# Patient Record
Sex: FEMALE | Race: WHITE
[De-identification: names, ages, dates, MRNs, and addresses within clinical notes are randomized per-mention and may not be internally consistent; named-entity substitution may affect disease eponyms.]

---

## 2018-12-30 ENCOUNTER — HOSPITAL ENCOUNTER (EMERGENCY)
Dept: HOSPITAL 80 - FED | Age: 28
Discharge: HOME | End: 2018-12-30
Payer: COMMERCIAL

## 2018-12-30 VITALS — DIASTOLIC BLOOD PRESSURE: 70 MMHG | SYSTOLIC BLOOD PRESSURE: 112 MMHG

## 2018-12-30 DIAGNOSIS — E86.9: ICD-10-CM

## 2018-12-30 DIAGNOSIS — K52.9: Primary | ICD-10-CM

## 2018-12-30 LAB — PLATELET # BLD: 347 10^3/UL (ref 150–400)

## 2018-12-30 NOTE — EDPHY
H & P


Time Seen by Provider: 12/30/18 20:18


HPI/ROS: 





CHIEF COMPLAINT:  Nausea vomiting diarrhea





HISTORY OF PRESENT ILLNESS:  Patient is a 28-year-old female with no 

significant past medical history who started with nausea vomiting diarrhea 

yesterday evening.  She has been unable to tolerate any orals today.  She has 

not urinated today.  She has no history of abdominal surgeries.  She has seen 

no blood in emesis or diarrhea.  She has no history of Crohn's or ulcerative 

colitis.  Denies any pain with urination.  She has had no fever.





REVIEW OF SYSTEMS:


Constitutional:  No fever, no chills.


Eyes:  No discharge.


ENT:  No sore throat.


Cardiovascular:  No chest pain, no palpitations.


Respiratory:  No cough, no shortness of breath.


Gastrointestinal:  + abdominal pain, + vomiting.


Genitourinary:  No hematuria.


Musculoskeletal:  No back pain.


Skin:  No rashes.


Neurological:  No headache.


Smoking Status: Never smoked


Physical Exam: 





General Appearance:  Alert and no distress.


ENT:  normal dentition.  No tonsillar exudate or swelling.


Eyes:  Pupils equal and round no injection.


Respiratory:  Chest is nontender, lungs are clear to auscultation.


Cardiac:  regular rate and rhythm.  No lower extremity edema


Gastrointestinal:  Abdomen is soft and nontender, no masses, bowel sounds 

normal.


Musculoskeletal:  Neck is supple and nontender.


Extremities have full range of motion and are nontender without deformity


Skin:  No rashes or lesions.


Neuro:  Cranial nerves grossly intact.   Ambulatory.





Constitutional: 


 Initial Vital Signs











Temperature (C)  36.8 C   12/30/18 19:59


 


Heart Rate  112 H  12/30/18 19:59


 


Respiratory Rate  20   12/30/18 19:59


 


Blood Pressure  135/89 H  12/30/18 19:59


 


O2 Sat (%)  95   12/30/18 19:59








 











O2 Delivery Mode               Room Air














Allergies/Adverse Reactions: 


 





Penicillins Allergy (Verified 12/30/18 19:59)


 








Home Medications: 














 Medication  Instructions  Recorded


 


Adderall 10 mg Tablet  12/30/18


 


Lexapro  12/30/18


 


Ondansetron Odt [Zofran Odt 4 mg 4 mg PO Q8 #12 tab 12/30/18





(*)]  














Medical Decision Making


ED Course/Re-evaluation: 





28-year-old female here with nausea vomiting and diarrhea consistent with viral 

gastroenteritis.  She was given IV hydration pins Zofran and Pepcid and feels 

significantly improved.  She is tolerating p.o. At time of discharge.  She is 

also given a small prescription for sublingual Zofran which she will use as 

needed.


Differential Diagnosis: 





Pregnancy, bowel obstruction, appendicitis, cholecystitis





- Data Points


Laboratory Results: 


 Laboratory Results





 12/30/18 20:26 





 12/30/18 20:26 





 











  12/30/18 12/30/18 12/30/18





  20:26 20:26 20:26


 


WBC      14.78 10^3/uL H 10^3/uL





     (3.80-9.50) 


 


RBC      5.35 10^6/uL H 10^6/uL





     (4.18-5.33) 


 


Hgb      17.2 g/dL H g/dL





     (12.6-16.3) 


 


Hct      48.4 % H %





     (38.0-47.0) 


 


MCV      90.5 fL fL





     (81.5-99.8) 


 


MCH      32.1 pg pg





     (27.9-34.1) 


 


MCHC      35.5 g/dL g/dL





     (32.4-36.7) 


 


RDW      12.4 % %





     (11.5-15.2) 


 


Plt Count      347 10^3/uL 10^3/uL





     (150-400) 


 


MPV      8.9 fL fL





     (8.7-11.7) 


 


Neut % (Auto)      92.0 % H %





     (39.3-74.2) 


 


Lymph % (Auto)      2.9 % L %





     (15.0-45.0) 


 


Mono % (Auto)      4.4 % L %





     (4.5-13.0) 


 


Eos % (Auto)      0.1 % L %





     (0.6-7.6) 


 


Baso % (Auto)      0.3 % %





     (0.3-1.7) 


 


Nucleat RBC Rel Count      0.0 % %





     (0.0-0.2) 


 


Absolute Neuts (auto)      13.60 10^3/uL H 10^3/uL





     (1.70-6.50) 


 


Absolute Lymphs (auto)      0.43 10^3/uL L 10^3/uL





     (1.00-3.00) 


 


Absolute Monos (auto)      0.65 10^3/uL 10^3/uL





     (0.30-0.80) 


 


Absolute Eos (auto)      0.01 10^3/uL L 10^3/uL





     (0.03-0.40) 


 


Absolute Basos (auto)      0.04 10^3/uL 10^3/uL





     (0.02-0.10) 


 


Absolute Nucleated RBC      0.00 10^3/uL 10^3/uL





     (0-0.01) 


 


Immature Gran %      0.3 % %





     (0.0-1.1) 


 


Immature Gran #      0.04 10^3/uL 10^3/uL





     (0.00-0.10) 


 


RBC/WBC/PLT Morphology      TNP 





    


 


Platelet Estimate      TNP 





    


 


Sodium    136 mEq/L mEq/L  





    (135-145)  


 


Potassium    3.7 mEq/L mEq/L  





    (3.5-5.2)  


 


Chloride    104 mEq/L mEq/L  





    ()  


 


Carbon Dioxide    21 mEq/l L mEq/l  





    (22-31)  


 


Anion Gap    11 mEq/L mEq/L  





    (6-14)  


 


BUN    11 mg/dL mg/dL  





    (7-23)  


 


Creatinine    0.7 mg/dL mg/dL  





    (0.6-1.0)  


 


Estimated GFR    > 60   





    


 


Glucose    117 mg/dL H mg/dL  





    ()  


 


Calcium    9.8 mg/dL mg/dL  





    (8.5-10.4)  


 


Total Bilirubin    1.9 mg/dL H mg/dL  





    (0.1-1.4)  


 


AST    23 IU/L IU/L  





    (14-46)  


 


ALT    26 IU/L IU/L  





    (9-52)  


 


Alkaline Phosphatase    75 IU/L IU/L  





    ()  


 


Total Protein    8.2 g/dL g/dL  





    (6.3-8.2)  


 


Albumin    5.0 g/dL g/dL  





    (3.5-5.0)  


 


Lipase    18 IU/L L IU/L  





    ()  


 


Beta HCG, Qual  NEGATIVE     





    


 


Urine Color      





    


 


Urine Appearance      





    


 


Urine pH      





    


 


Ur Specific Gravity      





    


 


Urine Protein      





    


 


Urine Ketones      





    


 


Urine Blood      





    


 


Urine Nitrate      





    


 


Urine Bilirubin      





    


 


Urine Urobilinogen      





    


 


Ur Leukocyte Esterase      





    


 


Urine RBC      





    


 


Urine WBC      





    


 


Ur Epithelial Cells      





    


 


Urine Mucus      





    


 


Urine Glucose      





    














  12/30/18





  20:21


 


WBC  





  


 


RBC  





  


 


Hgb  





  


 


Hct  





  


 


MCV  





  


 


MCH  





  


 


MCHC  





  


 


RDW  





  


 


Plt Count  





  


 


MPV  





  


 


Neut % (Auto)  





  


 


Lymph % (Auto)  





  


 


Mono % (Auto)  





  


 


Eos % (Auto)  





  


 


Baso % (Auto)  





  


 


Nucleat RBC Rel Count  





  


 


Absolute Neuts (auto)  





  


 


Absolute Lymphs (auto)  





  


 


Absolute Monos (auto)  





  


 


Absolute Eos (auto)  





  


 


Absolute Basos (auto)  





  


 


Absolute Nucleated RBC  





  


 


Immature Gran %  





  


 


Immature Gran #  





  


 


RBC/WBC/PLT Morphology  





  


 


Platelet Estimate  





  


 


Sodium  





  


 


Potassium  





  


 


Chloride  





  


 


Carbon Dioxide  





  


 


Anion Gap  





  


 


BUN  





  


 


Creatinine  





  


 


Estimated GFR  





  


 


Glucose  





  


 


Calcium  





  


 


Total Bilirubin  





  


 


AST  





  


 


ALT  





  


 


Alkaline Phosphatase  





  


 


Total Protein  





  


 


Albumin  





  


 


Lipase  





  


 


Beta HCG, Qual  





  


 


Urine Color  YELLOW 





  


 


Urine Appearance  CLEAR 





  


 


Urine pH  5.0 





   (5.0-7.5) 


 


Ur Specific Gravity  1.028 





   (1.002-1.030) 


 


Urine Protein  1+  H 





   (NEGATIVE) 


 


Urine Ketones  2+  H 





   (NEGATIVE) 


 


Urine Blood  NEGATIVE 





   (NEGATIVE) 


 


Urine Nitrate  NEGATIVE 





   (NEGATIVE) 


 


Urine Bilirubin  NEGATIVE 





   (NEGATIVE) 


 


Urine Urobilinogen  NEGATIVE EU EU





   (0.2-1.0) 


 


Ur Leukocyte Esterase  NEGATIVE 





   (NEGATIVE) 


 


Urine RBC  1-3 /hpf /hpf





   (0-3) 


 


Urine WBC  1-3 /hpf /hpf





   (0-3) 


 


Ur Epithelial Cells  TRACE /lpf /lpf





   (NONE-1+) 


 


Urine Mucus  2+ /lpf H /lpf





   (NONE-1+) 


 


Urine Glucose  NEGATIVE 





   (NEGATIVE) 











Medications Given: 


 








Discontinued Medications





Famotidine (Pepcid)  40 mg PO EDNOW ONE


   Stop: 12/30/18 20:22


   Last Admin: 12/30/18 20:32 Dose:  40 mg


Sodium Chloride (Ns)  1,000 mls @ 0 mls/hr IV ONCE ONE; Wide Open


   PRN Reason: Protocol


   Stop: 12/30/18 20:30


   Last Admin: 12/30/18 20:31 Dose:  1,000 mls


Ondansetron HCl (Zofran Odt)  4 mg PO EDNOW ONE


   Stop: 12/30/18 20:26


   Last Admin: 12/30/18 20:26 Dose:  4 mg








Departure





- Departure


Disposition: Home, Routine, Self-Care


Clinical Impression: 


 Gastroenteritis





Condition: Good


Instructions:  Gastroenteritis (ED)


Referrals: 


NONE *PRIMARY CARE P,. [Primary Care Provider] - As per Instructions


[facility],. [Clinic] - As per Instructions


Prescriptions: 


Ondansetron Odt [Zofran Odt 4 mg (*)] 4 mg PO Q8 #12 tab